# Patient Record
Sex: MALE | Race: WHITE | Employment: FULL TIME | URBAN - METROPOLITAN AREA
[De-identification: names, ages, dates, MRNs, and addresses within clinical notes are randomized per-mention and may not be internally consistent; named-entity substitution may affect disease eponyms.]

---

## 2017-01-27 ENCOUNTER — LAB ENCOUNTER (OUTPATIENT)
Dept: LAB | Facility: HOSPITAL | Age: 52
End: 2017-01-27
Attending: INTERNAL MEDICINE
Payer: COMMERCIAL

## 2017-01-27 DIAGNOSIS — R73.03 PREDIABETES: ICD-10-CM

## 2017-01-27 DIAGNOSIS — Z12.5 SPECIAL SCREENING FOR MALIGNANT NEOPLASM OF PROSTATE: ICD-10-CM

## 2017-01-27 DIAGNOSIS — E66.9 OBESITY (BMI 30-39.9): ICD-10-CM

## 2017-01-27 DIAGNOSIS — Z51.81 ENCOUNTER FOR THERAPEUTIC DRUG MONITORING: ICD-10-CM

## 2017-01-27 DIAGNOSIS — I10 ESSENTIAL HYPERTENSION: ICD-10-CM

## 2017-01-27 DIAGNOSIS — E78.00 PURE HYPERCHOLESTEROLEMIA: ICD-10-CM

## 2017-01-27 LAB
25-HYDROXYVITAMIN D (TOTAL): 29.1 NG/ML (ref 30–100)
ALBUMIN SERPL-MCNC: 4.3 G/DL (ref 3.5–4.8)
ALP LIVER SERPL-CCNC: 67 U/L (ref 45–117)
ALT SERPL-CCNC: 25 U/L (ref 17–63)
AST SERPL-CCNC: 14 U/L (ref 15–41)
BASOPHILS # BLD AUTO: 0.03 X10(3) UL (ref 0–0.1)
BASOPHILS NFR BLD AUTO: 0.4 %
BILIRUB SERPL-MCNC: 0.5 MG/DL (ref 0.1–2)
BILIRUB UR QL STRIP.AUTO: NEGATIVE
BUN BLD-MCNC: 14 MG/DL (ref 8–20)
CALCIUM BLD-MCNC: 9.1 MG/DL (ref 8.3–10.3)
CHLORIDE: 104 MMOL/L (ref 101–111)
CHOLEST SMN-MCNC: 130 MG/DL (ref ?–200)
CLARITY UR REFRACT.AUTO: CLEAR
CO2: 27 MMOL/L (ref 22–32)
COLOR UR AUTO: YELLOW
CREAT BLD-MCNC: 0.98 MG/DL (ref 0.7–1.3)
EOSINOPHIL # BLD AUTO: 0.05 X10(3) UL (ref 0–0.3)
EOSINOPHIL NFR BLD AUTO: 0.7 %
ERYTHROCYTE [DISTWIDTH] IN BLOOD BY AUTOMATED COUNT: 12.8 % (ref 11.5–16)
GLUCOSE BLD-MCNC: 106 MG/DL (ref 70–99)
GLUCOSE UR STRIP.AUTO-MCNC: NEGATIVE MG/DL
HCT VFR BLD AUTO: 47.6 % (ref 37–53)
HDLC SERPL-MCNC: 59 MG/DL (ref 45–?)
HDLC SERPL: 2.2 {RATIO} (ref ?–4.97)
HGB BLD-MCNC: 16.9 G/DL (ref 13–17)
IMMATURE GRANULOCYTE COUNT: 0.01 X10(3) UL (ref 0–1)
IMMATURE GRANULOCYTE RATIO %: 0.1 %
KETONES UR STRIP.AUTO-MCNC: NEGATIVE MG/DL
LDLC SERPL CALC-MCNC: 55 MG/DL (ref ?–130)
LEUKOCYTE ESTERASE UR QL STRIP.AUTO: NEGATIVE
LYMPHOCYTES # BLD AUTO: 1.69 X10(3) UL (ref 0.9–4)
LYMPHOCYTES NFR BLD AUTO: 24.9 %
M PROTEIN MFR SERPL ELPH: 8 G/DL (ref 6.1–8.3)
MCH RBC QN AUTO: 31 PG (ref 27–33.2)
MCHC RBC AUTO-ENTMCNC: 35.5 G/DL (ref 31–37)
MCV RBC AUTO: 87.2 FL (ref 80–99)
MONOCYTES # BLD AUTO: 0.59 X10(3) UL (ref 0.1–0.6)
MONOCYTES NFR BLD AUTO: 8.7 %
NEUTROPHIL ABS PRELIM: 4.41 X10 (3) UL (ref 1.3–6.7)
NEUTROPHILS # BLD AUTO: 4.41 X10(3) UL (ref 1.3–6.7)
NEUTROPHILS NFR BLD AUTO: 65.2 %
NITRITE UR QL STRIP.AUTO: NEGATIVE
NONHDLC SERPL-MCNC: 71 MG/DL (ref ?–130)
PH UR STRIP.AUTO: 6 [PH] (ref 4.5–8)
PLATELET # BLD AUTO: 245 10(3)UL (ref 150–450)
POTASSIUM SERPL-SCNC: 4.4 MMOL/L (ref 3.6–5.1)
PROT UR STRIP.AUTO-MCNC: NEGATIVE MG/DL
PSA SERPL-MCNC: 3.17 NG/ML (ref 0.01–4)
RBC # BLD AUTO: 5.46 X10(6)UL (ref 4.3–5.7)
RBC UR QL AUTO: NEGATIVE
RED CELL DISTRIBUTION WIDTH-SD: 39.8 FL (ref 35.1–46.3)
SODIUM SERPL-SCNC: 138 MMOL/L (ref 136–144)
SP GR UR STRIP.AUTO: 1.01 (ref 1–1.03)
TRIGLYCERIDES: 81 MG/DL (ref ?–150)
TSI SER-ACNC: 1.47 MIU/ML (ref 0.35–5.5)
UROBILINOGEN UR STRIP.AUTO-MCNC: <2 MG/DL
VLDL: 16 MG/DL (ref 5–40)
WBC # BLD AUTO: 6.8 X10(3) UL (ref 4–13)

## 2017-01-27 PROCEDURE — 81003 URINALYSIS AUTO W/O SCOPE: CPT

## 2017-01-27 PROCEDURE — 80061 LIPID PANEL: CPT

## 2017-01-27 PROCEDURE — 85025 COMPLETE CBC W/AUTO DIFF WBC: CPT

## 2017-01-27 PROCEDURE — 84443 ASSAY THYROID STIM HORMONE: CPT

## 2017-01-27 PROCEDURE — 36415 COLL VENOUS BLD VENIPUNCTURE: CPT

## 2017-01-27 PROCEDURE — 80053 COMPREHEN METABOLIC PANEL: CPT

## 2017-01-27 PROCEDURE — 82306 VITAMIN D 25 HYDROXY: CPT

## 2017-01-27 PROCEDURE — 84153 ASSAY OF PSA TOTAL: CPT

## 2017-02-06 NOTE — PROGRESS NOTES
Quick Note:    Low vit D, start vit D 50,000 U once weekly for 6 months.  PSA has doubled in 1 year, I want him to see urology Dr. Payton Weston.  ______

## 2017-02-08 ENCOUNTER — PATIENT MESSAGE (OUTPATIENT)
Dept: FAMILY MEDICINE CLINIC | Facility: CLINIC | Age: 52
End: 2017-02-08

## 2017-02-09 NOTE — TELEPHONE ENCOUNTER
From: Marcos Rubi  To: Tracey Lemus MD  Sent: 2/8/2017 4:34 PM CST  Subject: Visit Baron Tim Alcantara,    I scheduled my appointment with DR. Fide Chapin, the soonest he could see me is March 16th, please let me know if this time frame is

## 2017-03-16 PROBLEM — R97.20 RISING PSA LEVEL: Status: ACTIVE | Noted: 2017-03-16

## 2017-03-17 ENCOUNTER — OFFICE VISIT (OUTPATIENT)
Dept: FAMILY MEDICINE CLINIC | Facility: CLINIC | Age: 52
End: 2017-03-17

## 2017-03-17 VITALS
HEART RATE: 68 BPM | DIASTOLIC BLOOD PRESSURE: 80 MMHG | HEIGHT: 68 IN | WEIGHT: 201 LBS | RESPIRATION RATE: 16 BRPM | SYSTOLIC BLOOD PRESSURE: 136 MMHG | BODY MASS INDEX: 30.46 KG/M2

## 2017-03-17 DIAGNOSIS — E66.9 OBESITY (BMI 30-39.9): ICD-10-CM

## 2017-03-17 DIAGNOSIS — E78.00 PURE HYPERCHOLESTEROLEMIA: ICD-10-CM

## 2017-03-17 DIAGNOSIS — R73.03 PREDIABETES: ICD-10-CM

## 2017-03-17 DIAGNOSIS — K21.9 GASTROESOPHAGEAL REFLUX DISEASE, ESOPHAGITIS PRESENCE NOT SPECIFIED: ICD-10-CM

## 2017-03-17 DIAGNOSIS — Z51.81 ENCOUNTER FOR THERAPEUTIC DRUG MONITORING: Primary | ICD-10-CM

## 2017-03-17 DIAGNOSIS — I10 ESSENTIAL HYPERTENSION: ICD-10-CM

## 2017-03-17 PROCEDURE — 99214 OFFICE O/P EST MOD 30 MIN: CPT | Performed by: INTERNAL MEDICINE

## 2017-03-17 RX ORDER — ATORVASTATIN CALCIUM 10 MG/1
10 TABLET, FILM COATED ORAL
Qty: 90 TABLET | Refills: 3 | Status: SHIPPED | OUTPATIENT
Start: 2017-03-17 | End: 2018-03-15

## 2017-03-17 RX ORDER — OMEPRAZOLE 20 MG/1
CAPSULE, DELAYED RELEASE ORAL
Qty: 90 CAPSULE | Refills: 3 | Status: SHIPPED | OUTPATIENT
Start: 2017-03-17 | End: 2018-10-09 | Stop reason: ALTCHOICE

## 2017-03-17 RX ORDER — AMLODIPINE BESYLATE 5 MG/1
5 TABLET ORAL DAILY
Qty: 90 TABLET | Refills: 3 | Status: SHIPPED | OUTPATIENT
Start: 2017-03-17 | End: 2018-05-31

## 2017-03-17 RX ORDER — METFORMIN HYDROCHLORIDE 750 MG/1
1500 TABLET, EXTENDED RELEASE ORAL DAILY
Qty: 60 TABLET | Refills: 5 | Status: SHIPPED | OUTPATIENT
Start: 2017-03-17 | End: 2017-05-09

## 2017-03-17 RX ORDER — PHENTERMINE HYDROCHLORIDE 15 MG/1
15 CAPSULE ORAL EVERY MORNING
Qty: 30 CAPSULE | Refills: 2 | Status: SHIPPED | OUTPATIENT
Start: 2017-03-17 | End: 2017-08-10

## 2017-03-17 NOTE — PROGRESS NOTES
CC: Patient presents with:  Weight Check: up 4 lb       HPI:   1. Obesity/prediabetes, pt doing well phentermine, metformin and contrave. 2. Pure hypercholesterolemia, doing well on lipitor  3. Essential hypertension, doing well on norvasc  4.  GERD, Fracture, ribs     Hypogonadism male     Vitamin D deficiency     Hyperlipidemia     Prediabetes     Fatigue     MALINDA (obstructive sleep apnea)     Back pain     Obesity (BMI 30-39. 9)     Encounter for therapeutic drug monitoring     ED (erectile dysfunctio well on lipitor, labs are good. Will cont   3. Essential hypertension, doing well on norvasc, will cont  4. GERD, doing well on prilosec, will cont    The patient indicates understanding of these issues and agrees to the plan.   Return in about 3 months (ar

## 2017-05-09 RX ORDER — METFORMIN HYDROCHLORIDE 750 MG/1
1500 TABLET, EXTENDED RELEASE ORAL DAILY
Qty: 180 TABLET | Refills: 1 | OUTPATIENT
Start: 2017-05-09

## 2017-05-09 RX ORDER — METFORMIN HYDROCHLORIDE 750 MG/1
1500 TABLET, EXTENDED RELEASE ORAL DAILY
Qty: 180 TABLET | Refills: 0 | Status: SHIPPED | OUTPATIENT
Start: 2017-05-09 | End: 2017-08-10

## 2017-05-09 NOTE — TELEPHONE ENCOUNTER
Requesting Metformin ER 750mg  LOV: 3/17/17  RTC: 3 months  Last Labs: A1c 2013  Filled: 3/17/17 #60 with 5 refills    Future Appointments  Date Time Provider Riya Laureano   7/5/2017 8:40 AM Cornel Yoon MD [facility] [address] [facility] [address]       RX was refilled o

## 2017-06-13 RX ORDER — PHENTERMINE HYDROCHLORIDE 15 MG/1
CAPSULE ORAL
Qty: 30 CAPSULE | Refills: 2 | OUTPATIENT
Start: 2017-06-13

## 2017-06-13 NOTE — TELEPHONE ENCOUNTER
Requesting Phentermine 15 mg  LOV: 3/17/17  RTC: 3 mos  Last Labs: n.a  Filled: 3/17/17#30with 2 refills- will be discussed at [address] [place] [address] Rd denied    Future Appointments  Date Time Provider Riya Laureano   7/5/2017 8:40 AM Tracey Lemus MD [facility] [address] [facility] [address]

## 2017-07-05 ENCOUNTER — OFFICE VISIT (OUTPATIENT)
Dept: FAMILY MEDICINE CLINIC | Facility: CLINIC | Age: 52
End: 2017-07-05

## 2017-07-05 VITALS
HEIGHT: 68 IN | RESPIRATION RATE: 16 BRPM | SYSTOLIC BLOOD PRESSURE: 122 MMHG | BODY MASS INDEX: 30.62 KG/M2 | DIASTOLIC BLOOD PRESSURE: 78 MMHG | WEIGHT: 202 LBS | HEART RATE: 86 BPM

## 2017-07-05 DIAGNOSIS — Z51.81 ENCOUNTER FOR THERAPEUTIC DRUG MONITORING: Primary | ICD-10-CM

## 2017-07-05 DIAGNOSIS — E66.9 OBESITY (BMI 30-39.9): ICD-10-CM

## 2017-07-05 DIAGNOSIS — R73.03 PREDIABETES: ICD-10-CM

## 2017-07-05 PROCEDURE — 99213 OFFICE O/P EST LOW 20 MIN: CPT | Performed by: INTERNAL MEDICINE

## 2017-07-05 RX ORDER — PHENTERMINE HYDROCHLORIDE 37.5 MG/1
37.5 TABLET ORAL
Qty: 30 TABLET | Refills: 0 | Status: SHIPPED | OUTPATIENT
Start: 2017-07-05 | End: 2017-08-10

## 2017-08-10 ENCOUNTER — OFFICE VISIT (OUTPATIENT)
Dept: INTERNAL MEDICINE CLINIC | Facility: CLINIC | Age: 52
End: 2017-08-10

## 2017-08-10 VITALS
HEART RATE: 72 BPM | BODY MASS INDEX: 30.01 KG/M2 | WEIGHT: 198 LBS | SYSTOLIC BLOOD PRESSURE: 130 MMHG | RESPIRATION RATE: 16 BRPM | DIASTOLIC BLOOD PRESSURE: 82 MMHG | HEIGHT: 68 IN

## 2017-08-10 DIAGNOSIS — E66.9 OBESITY (BMI 30-39.9): ICD-10-CM

## 2017-08-10 DIAGNOSIS — Z51.81 ENCOUNTER FOR THERAPEUTIC DRUG MONITORING: Primary | ICD-10-CM

## 2017-08-10 DIAGNOSIS — R73.03 PREDIABETES: ICD-10-CM

## 2017-08-10 PROCEDURE — 99213 OFFICE O/P EST LOW 20 MIN: CPT | Performed by: INTERNAL MEDICINE

## 2017-08-10 RX ORDER — PHENTERMINE HYDROCHLORIDE 37.5 MG/1
37.5 TABLET ORAL
Qty: 30 TABLET | Refills: 0 | Status: SHIPPED | OUTPATIENT
Start: 2017-08-10 | End: 2017-09-05

## 2017-08-10 RX ORDER — MULTIVIT WITH MINERALS/LUTEIN
1000 TABLET ORAL DAILY
COMMUNITY
End: 2018-02-12

## 2017-08-10 RX ORDER — METFORMIN HYDROCHLORIDE 750 MG/1
1500 TABLET, EXTENDED RELEASE ORAL DAILY
Qty: 180 TABLET | Refills: 0 | Status: SHIPPED | OUTPATIENT
Start: 2017-08-10 | End: 2017-09-05

## 2017-08-10 NOTE — PROGRESS NOTES
CC: Patient presents with:  Weight Check: down 4 lbs       HPI:   Obesity, doing well on phentermine, contrave and metformin. Working out well.        Current Outpatient Prescriptions:  Ascorbic Acid (VITAMIN C) 1000 MG Oral Tab Take 1,000 mg by mouth d PSA level        REVIEW OF SYSTEMS:   CARDIOVASCULAR: denies chest pain    EXAM:   /82 (BP Location: Left arm, Patient Position: Sitting, Cuff Size: large)   Pulse 72   Resp 16   Ht 68\"   Wt 198 lb   BMI 30.11 kg/m²   GENERAL: A/O x3  HEENT:  SARA

## 2017-09-05 ENCOUNTER — OFFICE VISIT (OUTPATIENT)
Dept: INTERNAL MEDICINE CLINIC | Facility: CLINIC | Age: 52
End: 2017-09-05

## 2017-09-05 VITALS
HEIGHT: 68 IN | RESPIRATION RATE: 16 BRPM | HEART RATE: 86 BPM | WEIGHT: 199 LBS | DIASTOLIC BLOOD PRESSURE: 84 MMHG | BODY MASS INDEX: 30.16 KG/M2 | SYSTOLIC BLOOD PRESSURE: 124 MMHG

## 2017-09-05 DIAGNOSIS — R73.03 PREDIABETES: ICD-10-CM

## 2017-09-05 DIAGNOSIS — E66.9 OBESITY (BMI 30-39.9): ICD-10-CM

## 2017-09-05 DIAGNOSIS — Z51.81 ENCOUNTER FOR THERAPEUTIC DRUG MONITORING: Primary | ICD-10-CM

## 2017-09-05 PROCEDURE — 99213 OFFICE O/P EST LOW 20 MIN: CPT | Performed by: INTERNAL MEDICINE

## 2017-09-05 RX ORDER — METFORMIN HYDROCHLORIDE 750 MG/1
2250 TABLET, EXTENDED RELEASE ORAL DAILY
Qty: 270 TABLET | Refills: 3 | Status: SHIPPED | OUTPATIENT
Start: 2017-09-05 | End: 2018-09-18

## 2017-09-05 RX ORDER — PHENTERMINE HYDROCHLORIDE 37.5 MG/1
37.5 TABLET ORAL
Qty: 30 TABLET | Refills: 0 | Status: SHIPPED | OUTPATIENT
Start: 2017-09-05 | End: 2017-10-10

## 2017-09-05 NOTE — PROGRESS NOTES
CC: Patient presents with:  Weight Check: up 1 lb       HPI:   Obesity, doing well on phentermine, contrave and metformin. Some cravings at times.        Current Outpatient Prescriptions:  Echinacea-Goldenseal (ECHINACEA COMB/MAGDALENO SEAL OR) Take 1 tabl Encounter for therapeutic drug monitoring     ED (erectile dysfunction)     Constipation     Rising PSA level        REVIEW OF SYSTEMS:   CARDIOVASCULAR: denies chest pain    EXAM:   /84   Pulse 86   Resp 16   Ht 68\"   Wt 199 lb   BMI 30.26 kg/m²

## 2017-10-10 ENCOUNTER — PATIENT MESSAGE (OUTPATIENT)
Dept: INTERNAL MEDICINE CLINIC | Facility: CLINIC | Age: 52
End: 2017-10-10

## 2017-10-10 RX ORDER — PHENTERMINE HYDROCHLORIDE 37.5 MG/1
TABLET ORAL
Qty: 30 TABLET | Refills: 0
Start: 2017-10-10

## 2017-10-10 NOTE — TELEPHONE ENCOUNTER
1:41 PM   Note      Follow up in 1 week   We can give him a 9 day script if he would like or if he would like to wait         Per Dr. Castro Pitt note on 10/10/17.   Phentermine 37.5mg one daily #9 called to the pharmacist.  Patient notified via [facility]

## 2017-10-10 NOTE — TELEPHONE ENCOUNTER
From: [facility]  To: Wade Mir MD  Sent: 10/10/2017 4:16 PM CDT  Subject: Prescription Question    My prescription renewal was denied, my prescription runs out this Sunday. My next appt. Is not until the 19th.

## 2017-10-10 NOTE — TELEPHONE ENCOUNTER
Requesting Phentermine  LOV: 9/5/17  RTC: 4 weeks  Last Labs: n/a  Filled: 9/5/17 #30 with 0 refills    Future Appointments  Date Time Provider Riya Laureano   10/19/2017 8:00 AM [facility]     Filled 9/6/17 #30 for 30 days on I

## 2017-10-11 RX ORDER — PHENTERMINE HYDROCHLORIDE 37.5 MG/1
37.5 TABLET ORAL
Qty: 9 TABLET | Refills: 0 | OUTPATIENT
Start: 2017-10-11 | End: 2017-10-19

## 2017-10-19 ENCOUNTER — OFFICE VISIT (OUTPATIENT)
Dept: INTERNAL MEDICINE CLINIC | Facility: CLINIC | Age: 52
End: 2017-10-19

## 2017-10-19 VITALS
SYSTOLIC BLOOD PRESSURE: 122 MMHG | RESPIRATION RATE: 16 BRPM | HEIGHT: 68 IN | BODY MASS INDEX: 29.7 KG/M2 | DIASTOLIC BLOOD PRESSURE: 74 MMHG | HEART RATE: 80 BPM | WEIGHT: 196 LBS

## 2017-10-19 DIAGNOSIS — Z51.81 ENCOUNTER FOR THERAPEUTIC DRUG MONITORING: Primary | ICD-10-CM

## 2017-10-19 DIAGNOSIS — E66.9 OBESITY (BMI 30-39.9): ICD-10-CM

## 2017-10-19 PROCEDURE — 99213 OFFICE O/P EST LOW 20 MIN: CPT | Performed by: INTERNAL MEDICINE

## 2017-10-19 RX ORDER — PHENTERMINE HYDROCHLORIDE 37.5 MG/1
37.5 TABLET ORAL
Qty: 9 TABLET | Refills: 0 | Status: CANCELLED | OUTPATIENT
Start: 2017-10-19

## 2017-10-19 RX ORDER — PHENTERMINE HYDROCHLORIDE 37.5 MG/1
37.5 TABLET ORAL
Qty: 30 TABLET | Refills: 0 | Status: SHIPPED | OUTPATIENT
Start: 2017-10-19 | End: 2017-11-14

## 2017-11-14 ENCOUNTER — OFFICE VISIT (OUTPATIENT)
Dept: INTERNAL MEDICINE CLINIC | Facility: CLINIC | Age: 52
End: 2017-11-14

## 2017-11-14 VITALS
HEART RATE: 78 BPM | RESPIRATION RATE: 16 BRPM | DIASTOLIC BLOOD PRESSURE: 78 MMHG | HEIGHT: 68 IN | BODY MASS INDEX: 29.25 KG/M2 | WEIGHT: 193 LBS | SYSTOLIC BLOOD PRESSURE: 118 MMHG

## 2017-11-14 DIAGNOSIS — Z51.81 ENCOUNTER FOR THERAPEUTIC DRUG MONITORING: Primary | ICD-10-CM

## 2017-11-14 DIAGNOSIS — E66.9 OBESITY (BMI 30-39.9): ICD-10-CM

## 2017-11-14 PROCEDURE — 99213 OFFICE O/P EST LOW 20 MIN: CPT | Performed by: INTERNAL MEDICINE

## 2017-11-14 RX ORDER — PHENTERMINE HYDROCHLORIDE 37.5 MG/1
37.5 TABLET ORAL
Qty: 30 TABLET | Refills: 0 | Status: SHIPPED | OUTPATIENT
Start: 2017-11-14 | End: 2017-12-11

## 2017-11-14 NOTE — PROGRESS NOTES
CC: Patient presents with:  Weight Check: down 3 lb       HPI:     Patient here for follow up   Walking very well through the airport, gets a lot of steps in. Denies any issues with medication  Feels cravings are well controlled.    Denies any chest Vitamin D deficiency     Hyperlipidemia     Prediabetes     Fatigue     MALINDA (obstructive sleep apnea)     Back pain     Obesity (BMI 30-39. 9)     Encounter for therapeutic drug monitoring     ED (erectile dysfunction)     Constipation     Rising PSA level indicates understanding of these issues and agrees to the plan. Return in about 4 weeks (around 12/12/2017) for weight management.

## 2017-12-11 ENCOUNTER — OFFICE VISIT (OUTPATIENT)
Dept: INTERNAL MEDICINE CLINIC | Facility: CLINIC | Age: 52
End: 2017-12-11

## 2017-12-11 VITALS
HEART RATE: 78 BPM | HEIGHT: 68 IN | RESPIRATION RATE: 16 BRPM | BODY MASS INDEX: 28.95 KG/M2 | SYSTOLIC BLOOD PRESSURE: 122 MMHG | WEIGHT: 191 LBS | DIASTOLIC BLOOD PRESSURE: 78 MMHG

## 2017-12-11 DIAGNOSIS — E66.9 OBESITY (BMI 30-39.9): ICD-10-CM

## 2017-12-11 DIAGNOSIS — Z51.81 ENCOUNTER FOR THERAPEUTIC DRUG MONITORING: Primary | ICD-10-CM

## 2017-12-11 PROCEDURE — 99213 OFFICE O/P EST LOW 20 MIN: CPT | Performed by: INTERNAL MEDICINE

## 2017-12-11 RX ORDER — PHENTERMINE HYDROCHLORIDE 37.5 MG/1
37.5 TABLET ORAL
Qty: 30 TABLET | Refills: 0 | Status: SHIPPED | OUTPATIENT
Start: 2017-12-11 | End: 2018-01-09

## 2017-12-11 NOTE — PROGRESS NOTES
CC: Patient presents with:  Weight Check: down 2 lb       HPI:       Here for follow up   Denies any issues today   Still losing weight  Walks all the time at work   Continues with regular exercise            Current Outpatient Prescriptions:  Phente (obstructive sleep apnea)     Back pain     Obesity (BMI 30-39. 9)     Encounter for therapeutic drug monitoring     ED (erectile dysfunction)     Constipation     Rising PSA level        REVIEW OF SYSTEMS:   GENERAL HEALTH: feels well otherwise, denied any (around 1/8/2018) for weight management.

## 2018-01-09 ENCOUNTER — OFFICE VISIT (OUTPATIENT)
Dept: INTERNAL MEDICINE CLINIC | Facility: CLINIC | Age: 53
End: 2018-01-09

## 2018-01-09 VITALS
WEIGHT: 194 LBS | SYSTOLIC BLOOD PRESSURE: 136 MMHG | DIASTOLIC BLOOD PRESSURE: 84 MMHG | HEIGHT: 68 IN | RESPIRATION RATE: 16 BRPM | BODY MASS INDEX: 29.4 KG/M2 | HEART RATE: 80 BPM

## 2018-01-09 DIAGNOSIS — Z51.81 ENCOUNTER FOR THERAPEUTIC DRUG MONITORING: Primary | ICD-10-CM

## 2018-01-09 DIAGNOSIS — E66.9 OBESITY (BMI 30-39.9): ICD-10-CM

## 2018-01-09 PROCEDURE — 99213 OFFICE O/P EST LOW 20 MIN: CPT | Performed by: INTERNAL MEDICINE

## 2018-01-09 RX ORDER — PHENTERMINE HYDROCHLORIDE 37.5 MG/1
37.5 TABLET ORAL
Qty: 30 TABLET | Refills: 0 | Status: SHIPPED | OUTPATIENT
Start: 2018-01-09 | End: 2018-02-12

## 2018-01-09 NOTE — PROGRESS NOTES
CC: Patient presents with:  Weight Check: 3 pound weight gain       HPI:       Struggled with holiday weight gain. Gained 3 lbs since previous visit. Was 189 on his home scale then gained weight back.            Current Outpatient Prescriptions: (obstructive sleep apnea)     Back pain     Obesity (BMI 30-39. 9)     Encounter for therapeutic drug monitoring     ED (erectile dysfunction)     Constipation     Rising PSA level        REVIEW OF SYSTEMS:   GENERAL HEALTH: feels well otherwise, denied any

## 2018-01-13 ENCOUNTER — LAB ENCOUNTER (OUTPATIENT)
Dept: LAB | Facility: HOSPITAL | Age: 53
End: 2018-01-13
Attending: INTERNAL MEDICINE
Payer: COMMERCIAL

## 2018-01-13 DIAGNOSIS — Z12.11 COLON CANCER SCREENING: ICD-10-CM

## 2018-01-13 DIAGNOSIS — Z80.42 FAMILY HISTORY OF MALIGNANT NEOPLASM OF PROSTATE: ICD-10-CM

## 2018-01-13 DIAGNOSIS — Z01.89 ROUTINE LAB DRAW: ICD-10-CM

## 2018-01-13 DIAGNOSIS — Z00.00 WELLNESS EXAMINATION: ICD-10-CM

## 2018-01-13 DIAGNOSIS — E55.9 VITAMIN D DEFICIENCY: Primary | ICD-10-CM

## 2018-01-13 LAB
ALBUMIN SERPL-MCNC: 4.2 G/DL (ref 3.5–4.8)
ALP LIVER SERPL-CCNC: 53 U/L (ref 45–117)
ALT SERPL-CCNC: 28 U/L (ref 17–63)
AST SERPL-CCNC: 19 U/L (ref 15–41)
BASOPHILS # BLD AUTO: 0.03 X10(3) UL (ref 0–0.1)
BASOPHILS NFR BLD AUTO: 0.4 %
BILIRUB SERPL-MCNC: 0.6 MG/DL (ref 0.1–2)
BUN BLD-MCNC: 12 MG/DL (ref 8–20)
CALCIUM BLD-MCNC: 9.1 MG/DL (ref 8.3–10.3)
CHLORIDE: 107 MMOL/L (ref 101–111)
CHOLEST SMN-MCNC: 117 MG/DL (ref ?–200)
CO2: 25 MMOL/L (ref 22–32)
CREAT BLD-MCNC: 0.95 MG/DL (ref 0.7–1.3)
EOSINOPHIL # BLD AUTO: 0.05 X10(3) UL (ref 0–0.3)
EOSINOPHIL NFR BLD AUTO: 0.7 %
ERYTHROCYTE [DISTWIDTH] IN BLOOD BY AUTOMATED COUNT: 12.4 % (ref 11.5–16)
GLUCOSE BLD-MCNC: 99 MG/DL (ref 70–99)
HAV AB SERPL IA-ACNC: 1379 PG/ML (ref 193–986)
HCT VFR BLD AUTO: 45.2 % (ref 37–53)
HDLC SERPL-MCNC: 52 MG/DL (ref 45–?)
HDLC SERPL: 2.25 {RATIO} (ref ?–4.97)
HGB BLD-MCNC: 16.2 G/DL (ref 13–17)
IMMATURE GRANULOCYTE COUNT: 0.01 X10(3) UL (ref 0–1)
IMMATURE GRANULOCYTE RATIO %: 0.1 %
LDLC SERPL CALC-MCNC: 47 MG/DL (ref ?–130)
LYMPHOCYTES # BLD AUTO: 1.6 X10(3) UL (ref 0.9–4)
LYMPHOCYTES NFR BLD AUTO: 23.4 %
M PROTEIN MFR SERPL ELPH: 7.6 G/DL (ref 6.1–8.3)
MCH RBC QN AUTO: 31.5 PG (ref 27–33.2)
MCHC RBC AUTO-ENTMCNC: 35.8 G/DL (ref 31–37)
MCV RBC AUTO: 87.8 FL (ref 80–99)
MONOCYTES # BLD AUTO: 0.49 X10(3) UL (ref 0.1–0.6)
MONOCYTES NFR BLD AUTO: 7.2 %
NEUTROPHIL ABS PRELIM: 4.66 X10 (3) UL (ref 1.3–6.7)
NEUTROPHILS # BLD AUTO: 4.66 X10(3) UL (ref 1.3–6.7)
NEUTROPHILS NFR BLD AUTO: 68.2 %
NONHDLC SERPL-MCNC: 65 MG/DL (ref ?–130)
PLATELET # BLD AUTO: 226 10(3)UL (ref 150–450)
POTASSIUM SERPL-SCNC: 4.3 MMOL/L (ref 3.6–5.1)
PSA SERPL-MCNC: 3.53 NG/ML (ref 0.01–4)
RBC # BLD AUTO: 5.15 X10(6)UL (ref 4.3–5.7)
RED CELL DISTRIBUTION WIDTH-SD: 39.7 FL (ref 35.1–46.3)
SODIUM SERPL-SCNC: 139 MMOL/L (ref 136–144)
TRIGL SERPL-MCNC: 89 MG/DL (ref ?–150)
TSI SER-ACNC: 1.89 MIU/ML (ref 0.35–5.5)
VLDLC SERPL CALC-MCNC: 18 MG/DL (ref 5–40)
WBC # BLD AUTO: 6.8 X10(3) UL (ref 4–13)

## 2018-01-13 PROCEDURE — 82607 VITAMIN B-12: CPT

## 2018-01-13 PROCEDURE — 84153 ASSAY OF PSA TOTAL: CPT

## 2018-01-13 PROCEDURE — 36415 COLL VENOUS BLD VENIPUNCTURE: CPT

## 2018-01-13 PROCEDURE — 85025 COMPLETE CBC W/AUTO DIFF WBC: CPT

## 2018-01-13 PROCEDURE — 82652 VIT D 1 25-DIHYDROXY: CPT

## 2018-01-13 PROCEDURE — 84443 ASSAY THYROID STIM HORMONE: CPT

## 2018-01-13 PROCEDURE — 80053 COMPREHEN METABOLIC PANEL: CPT

## 2018-01-13 PROCEDURE — 80061 LIPID PANEL: CPT

## 2018-01-14 LAB — 1,25-DIHYDROXYVITAMIN D: 42.1 PG/ML

## 2018-02-12 ENCOUNTER — OFFICE VISIT (OUTPATIENT)
Dept: INTERNAL MEDICINE CLINIC | Facility: CLINIC | Age: 53
End: 2018-02-12

## 2018-02-12 VITALS
BODY MASS INDEX: 29.55 KG/M2 | WEIGHT: 195 LBS | HEART RATE: 68 BPM | RESPIRATION RATE: 16 BRPM | DIASTOLIC BLOOD PRESSURE: 78 MMHG | HEIGHT: 68 IN | SYSTOLIC BLOOD PRESSURE: 120 MMHG

## 2018-02-12 DIAGNOSIS — E66.9 OBESITY (BMI 30-39.9): ICD-10-CM

## 2018-02-12 DIAGNOSIS — Z51.81 THERAPEUTIC DRUG MONITORING: Primary | ICD-10-CM

## 2018-02-12 PROCEDURE — 99213 OFFICE O/P EST LOW 20 MIN: CPT | Performed by: INTERNAL MEDICINE

## 2018-02-12 RX ORDER — PHENTERMINE HYDROCHLORIDE 37.5 MG/1
37.5 TABLET ORAL
Qty: 30 TABLET | Refills: 0 | Status: CANCELLED | OUTPATIENT
Start: 2018-02-12

## 2018-02-12 RX ORDER — PHENTERMINE HYDROCHLORIDE 15 MG/1
15 CAPSULE ORAL EVERY MORNING
Qty: 30 CAPSULE | Refills: 2 | Status: SHIPPED | OUTPATIENT
Start: 2018-02-12 | End: 2018-05-11

## 2018-02-12 NOTE — PROGRESS NOTES
CC: Patient presents with:  Weight Check: up 1       HPI:     Work stress, lots of travel  Denies any chest pain   Note good appetite suppression   Walks 10,000 steps per day on average          Current Outpatient Prescriptions:  Phentermine HCl 15 M chills or night sweats   RESPIRATORY: denies shortness of breath   CARDIOVASCULAR: denies chest pain  GI: denies abdominal pain    EXAM:   /78   Pulse 68   Resp 16   Ht 68\"   Wt 195 lb   BMI 29.65 kg/m²   GENERAL: well developed, well nourished,in n

## 2018-03-12 RX ORDER — ATORVASTATIN CALCIUM 10 MG/1
10 TABLET, FILM COATED ORAL
Qty: 90 TABLET | Refills: 3 | OUTPATIENT
Start: 2018-03-12

## 2018-03-13 RX ORDER — ATORVASTATIN CALCIUM 10 MG/1
10 TABLET, FILM COATED ORAL
Qty: 90 TABLET | Refills: 3 | OUTPATIENT
Start: 2018-03-13

## 2018-03-15 RX ORDER — ATORVASTATIN CALCIUM 10 MG/1
10 TABLET, FILM COATED ORAL
Qty: 90 TABLET | Refills: 3 | OUTPATIENT
Start: 2018-03-15

## 2018-03-20 RX ORDER — ATORVASTATIN CALCIUM 10 MG/1
10 TABLET, FILM COATED ORAL
Qty: 90 TABLET | Refills: 0 | Status: SHIPPED
Start: 2018-03-20 | End: 2018-07-02

## 2018-03-20 NOTE — TELEPHONE ENCOUNTER
Medication(s) to Refill:   Pending Prescriptions Disp Refills    atorvastatin 10 MG Oral Tab 90 tablet 3     Sig: Take 1 tablet (10 mg total) by mouth once daily.              Reason for Medication Refill being sent to Provider / Reason Protocol Failed:  []

## 2018-03-20 NOTE — TELEPHONE ENCOUNTER
From: Xenia Daugherty  Sent: 3/15/2018 4:31 PM CDT  Subject: Medication Renewal Request    Xenia Daugherty would like a refill of the following medications:     Atorvastatin Calcium 10 MG Oral Tab Jocelyn Dick MD]   Patient Comment: [facility] has been requestin

## 2018-04-05 PROCEDURE — 88305 TISSUE EXAM BY PATHOLOGIST: CPT | Performed by: INTERNAL MEDICINE

## 2018-05-29 RX ORDER — AMLODIPINE BESYLATE 5 MG/1
TABLET ORAL
Qty: 90 TABLET | Refills: 2 | OUTPATIENT
Start: 2018-05-29

## 2018-06-09 RX ORDER — OMEPRAZOLE 20 MG/1
CAPSULE, DELAYED RELEASE ORAL
Qty: 90 CAPSULE | Refills: 3 | OUTPATIENT
Start: 2018-06-09

## 2018-06-18 RX ORDER — ATORVASTATIN CALCIUM 10 MG/1
TABLET, FILM COATED ORAL
Qty: 90 TABLET | Refills: 0 | OUTPATIENT
Start: 2018-06-18

## 2018-07-02 RX ORDER — NALTREXONE HYDROCHLORIDE AND BUPROPION HYDROCHLORIDE 8; 90 MG/1; MG/1
TABLET, EXTENDED RELEASE ORAL
Qty: 120 TABLET | Refills: 5 | OUTPATIENT
Start: 2018-07-02

## 2018-07-02 RX ORDER — ATORVASTATIN CALCIUM 10 MG/1
10 TABLET, FILM COATED ORAL
Qty: 90 TABLET | Refills: 0 | Status: SHIPPED | OUTPATIENT
Start: 2018-07-02 | End: 2018-10-09

## 2018-07-02 NOTE — TELEPHONE ENCOUNTER
LOV 6-11-18  LRF 3-20-18  Labs 1-14-18  LDL Cholesterol <130 mg/dL 47        Signed Prescriptions Disp Refills    atorvastatin 10 MG Oral Tab 90 tablet 0      Sig: Take 1 tablet (10 mg total) by mouth once daily.         Authorizing Provider: Ruby Ahmadi

## 2018-07-02 NOTE — TELEPHONE ENCOUNTER
From: Angi Milian  Sent: 7/2/2018 7:46 AM CDT  Subject: Medication Renewal Request    Angi Milian would like a refill of the following medications:     atorvastatin 10 MG Oral Tab Abimael Turner MD]    Preferred pharmacy: [facility]

## 2018-09-28 RX ORDER — ATORVASTATIN CALCIUM 10 MG/1
TABLET, FILM COATED ORAL
Qty: 90 TABLET | Refills: 0 | OUTPATIENT
Start: 2018-09-28

## 2018-11-05 RX ORDER — METFORMIN HYDROCHLORIDE 750 MG/1
TABLET, EXTENDED RELEASE ORAL
Qty: 270 TABLET | Refills: 3 | OUTPATIENT
Start: 2018-11-05

## 2020-05-22 ENCOUNTER — LAB ENCOUNTER (OUTPATIENT)
Dept: LAB | Facility: HOSPITAL | Age: 55
End: 2020-05-22
Attending: INTERNAL MEDICINE
Payer: COMMERCIAL

## 2020-05-22 DIAGNOSIS — I10 ESSENTIAL HYPERTENSION: ICD-10-CM

## 2020-05-22 DIAGNOSIS — R73.03 PREDIABETES: ICD-10-CM

## 2020-05-22 DIAGNOSIS — E55.9 VITAMIN D DEFICIENCY: ICD-10-CM

## 2020-05-22 DIAGNOSIS — Z51.81 ENCOUNTER FOR THERAPEUTIC DRUG MONITORING: ICD-10-CM

## 2020-05-22 DIAGNOSIS — E66.9 OBESITY (BMI 30-39.9): ICD-10-CM

## 2020-05-22 PROCEDURE — 82306 VITAMIN D 25 HYDROXY: CPT

## 2020-05-22 PROCEDURE — 80053 COMPREHEN METABOLIC PANEL: CPT

## 2020-05-22 PROCEDURE — 82607 VITAMIN B-12: CPT

## 2020-05-22 PROCEDURE — 80061 LIPID PANEL: CPT

## 2020-05-22 PROCEDURE — 84443 ASSAY THYROID STIM HORMONE: CPT

## 2020-05-22 PROCEDURE — 36415 COLL VENOUS BLD VENIPUNCTURE: CPT

## 2022-03-10 PROBLEM — R93.1 ELEVATED CORONARY ARTERY CALCIUM SCORE: Status: ACTIVE | Noted: 2022-03-10

## 2022-03-10 PROBLEM — R94.39 ABNORMAL NUCLEAR STRESS TEST: Status: ACTIVE | Noted: 2022-03-10

## 2022-03-25 ENCOUNTER — HOSPITAL ENCOUNTER (OUTPATIENT)
Dept: INTERVENTIONAL RADIOLOGY/VASCULAR | Facility: HOSPITAL | Age: 57
Discharge: HOME | End: 2022-03-25
Attending: INTERNAL MEDICINE | Admitting: INTERNAL MEDICINE
Payer: COMMERCIAL

## 2022-03-25 VITALS
HEART RATE: 59 BPM | BODY MASS INDEX: 27.58 KG/M2 | SYSTOLIC BLOOD PRESSURE: 143 MMHG | TEMPERATURE: 97 F | WEIGHT: 182 LBS | RESPIRATION RATE: 13 BRPM | HEIGHT: 68 IN | OXYGEN SATURATION: 100 % | DIASTOLIC BLOOD PRESSURE: 80 MMHG

## 2022-03-25 DIAGNOSIS — R94.39 ABNORMAL STRESS TEST: ICD-10-CM

## 2022-03-25 PROCEDURE — 4A023N7 MEASUREMENT OF CARDIAC SAMPLING AND PRESSURE, LEFT HEART, PERCUTANEOUS APPROACH: ICD-10-PCS | Performed by: INTERNAL MEDICINE

## 2022-03-25 PROCEDURE — B2111ZZ FLUOROSCOPY OF MULTIPLE CORONARY ARTERIES USING LOW OSMOLAR CONTRAST: ICD-10-PCS | Performed by: INTERNAL MEDICINE

## 2022-03-25 PROCEDURE — B2151ZZ FLUOROSCOPY OF LEFT HEART USING LOW OSMOLAR CONTRAST: ICD-10-PCS | Performed by: INTERNAL MEDICINE

## 2022-03-25 PROCEDURE — 93458 L HRT ARTERY/VENTRICLE ANGIO: CPT

## 2022-03-25 PROCEDURE — 99152 MOD SED SAME PHYS/QHP 5/>YRS: CPT

## 2022-03-25 RX ORDER — MIDAZOLAM HYDROCHLORIDE 1 MG/ML
INJECTION INTRAMUSCULAR; INTRAVENOUS
Status: COMPLETED
Start: 2022-03-25 | End: 2022-03-25

## 2022-03-25 RX ORDER — HEPARIN SODIUM 5000 [USP'U]/ML
INJECTION, SOLUTION INTRAVENOUS; SUBCUTANEOUS
Status: COMPLETED
Start: 2022-03-25 | End: 2022-03-25

## 2022-03-25 RX ORDER — SODIUM CHLORIDE 9 MG/ML
INJECTION, SOLUTION INTRAVENOUS
Status: DISCONTINUED | OUTPATIENT
Start: 2022-03-26 | End: 2022-03-25 | Stop reason: HOSPADM

## 2022-03-25 RX ORDER — LIDOCAINE HYDROCHLORIDE 10 MG/ML
INJECTION, SOLUTION EPIDURAL; INFILTRATION; INTRACAUDAL; PERINEURAL
Status: COMPLETED
Start: 2022-03-25 | End: 2022-03-25

## 2022-03-25 NOTE — PROCEDURES
[facility] Cardiology      Procedure:  LHC, CORONARY ANGIO, LV ANGIO,                          PERCLOSE RFA      Findings    LVEF: 60%    LM: NORMAL    LCx: PLAQUE    LAD: PLAQUE    RCA: PLAQUE

## 2022-03-25 NOTE — PROGRESS NOTES
Patient discharged home post cardiac cath. Pt is able to sit up and ambulate without difficulty. Pt's vital signs are stable. Procedural access site is dry and intact with no signs and symptoms of bleeding or hematoma. Pt tolerated food/fluids. Dr. Natalio Beckett spoke to pt/family post procedure. Instructions provided and pt/family verbalized understanding. PIV removed. Patient discharged in wheelchair with all belongings. Wife driving home.

## 2022-03-26 NOTE — PROCEDURES
[facility]    PATIENT'S NAME: Zahra Ramos   ATTENDING PHYSICIAN: Daniele Murdock M.D. OPERATING PHYSICIAN: Daniele Murdock M.D. PATIENT ACCOUNT#:   [de-identified]    LOCATION:  [address]  MEDICAL RECORD #:   [id number]       YOB: 1965  ADMISSION DATE:       03/25/2022      OPERATION DATE:  03/25/2022    CARDIAC PROCEDURE TRANSCRIPTION    CARDIAC CATHETERIZATION     PREOPERATIVE DIAGNOSIS:  POSTOPERATIVE DIAGNOSIS:  PROCEDURE PERFORMED:    CLINICAL SUMMARY:  The patient is a 58-year-old gentleman who has known coronary artery disease. A recent nuclear stress test was abnormal suggesting ischemia. He was advised to undergo cardiac catheterization. SEDATION:  Moderate sedation was employed using 2 mg of IV Versed, 50 mcg of IV fentanyl. A trained professional under my supervision and I observed the patient from [phone] [id number] until [id number] for a total of 20 minutes. DESCRIPTION OF PROCEDURE:  The procedure consisted of left heart catheterization, left ventriculography, coronary angiography. Hemodynamics were measured using a pigtail catheter advanced from the right femoral artery to the ascending aorta, across the aortic valve into the left ventricle. Pressure was measured in each of those locations. Selective coronary angiography of the left and right coronary arteries was achieved using a 6-Pashto 4 left and 6-Pashto 4 right Heide catheters, respectively. HEMODYNAMICS:  The left ventricular pressure is 127/10, /64, with a mean of 87. LEFT VENTRICULOGRAPHY:  The procedure was performed in the 30-degree SWAN projection. This demonstrated normal left ventricular size and systolic function. Ejection fraction approximately 60%. CORONARY ANGIOGRAPHY:  Fluoroscopy demonstrates calcification of the left coronary artery.   The left main segment of the left coronary artery is normal.  The left circumflex artery is a small to medium-sized artery giving rise to a medium-sized posterolateral branch, small to medium-sized posterior left ventricular branch. The left circumflex artery has nonobstructive plaquing in the proximal portion of the vessel, narrowing the vessel by approximately 50%. A large ramus intermedius artery is present. This vessel displays mild nonobstructive plaquing. The LAD is a medium-sized vessel which gives rise to a small diagonal branch. The LAD system displays mild nonobstructive plaquing. The right coronary artery is a medium-sized, anatomically dominant vessel. It gives rise to a medium-sized posterior descending artery, small posterior left ventricular branch. The right coronary artery displays mild nonobstructive plaquing. At the end of the procedure, the right femoral artery puncture site was closed using a Perclose device without difficulty. IMPRESSION:  1. Normal left ventricular diastolic function. 2.   Normal left ventricular size and systolic function. 3.   Nonobstructive coronary disease as described above.     Dictated By Rodrick Wilkinson M.D.  d: 03/25/2022 10:36:07  t: 03/25/2022 15:29:32  [facility] [id number]  [facility] [place]

## (undated) NOTE — MR AVS SNAPSHOT
[facility] [address]  [address] [facility]  [phone]               Thank you for choosing us for your health care visit with Gaby Vallejo MD.  We are glad to serve you and happy to provide you with t MetFORMIN HCl  MG Tb24   Take 2 tablets (1,500 mg total) by mouth daily. What changed:  how much to take   Commonly known as:  GLUCOPHAGE-XR           MULTI-VITAMIN Tabs   Take 1 Tab by mouth daily.            Naltrexone-Bupropion HCl ER 8-90 MG Tb